# Patient Record
Sex: MALE | Race: WHITE | NOT HISPANIC OR LATINO | Employment: OTHER | URBAN - METROPOLITAN AREA
[De-identification: names, ages, dates, MRNs, and addresses within clinical notes are randomized per-mention and may not be internally consistent; named-entity substitution may affect disease eponyms.]

---

## 2018-09-13 ENCOUNTER — APPOINTMENT (OUTPATIENT)
Dept: RADIOLOGY | Age: 83
End: 2018-09-13
Payer: MEDICARE

## 2018-09-13 ENCOUNTER — HOSPITAL ENCOUNTER (OUTPATIENT)
Dept: RADIOLOGY | Facility: HOSPITAL | Age: 83
Discharge: HOME | End: 2018-09-13
Payer: MEDICARE

## 2018-09-13 ENCOUNTER — OFFICE VISIT (OUTPATIENT)
Dept: URGENT CARE | Age: 83
End: 2018-09-13
Payer: MEDICARE

## 2018-09-13 VITALS
WEIGHT: 138 LBS | SYSTOLIC BLOOD PRESSURE: 200 MMHG | OXYGEN SATURATION: 96 % | TEMPERATURE: 97.3 F | HEIGHT: 69 IN | HEART RATE: 70 BPM | DIASTOLIC BLOOD PRESSURE: 76 MMHG | BODY MASS INDEX: 20.44 KG/M2
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
## 2018-09-13 DIAGNOSIS — S40.811A ABRASION OF RIGHT UPPER EXTREMITY, INITIAL ENCOUNTER: Primary | ICD-10-CM

## 2018-09-13 DIAGNOSIS — S80.811A ABRASION OF RIGHT LOWER EXTREMITY, INITIAL ENCOUNTER: ICD-10-CM

## 2018-09-13 PROCEDURE — 73090 X-RAY EXAM OF FOREARM: CPT

## 2018-09-13 PROCEDURE — 73060 X-RAY EXAM OF HUMERUS: CPT

## 2018-09-13 PROCEDURE — 71100 X-RAY EXAM RIBS UNI 2 VIEWS: CPT

## 2018-09-13 PROCEDURE — G0463 HOSPITAL OUTPT CLINIC VISIT: HCPCS | Performed by: PHYSICIAN ASSISTANT

## 2018-09-13 RX ORDER — CEPHALEXIN 500 MG/1
500 CAPSULE ORAL EVERY 12 HOURS SCHEDULED
Qty: 14 CAPSULE | Refills: 0 | Status: SHIPPED | OUTPATIENT
Start: 2018-09-13 | End: 2018-09-20

## 2018-09-13 RX ORDER — ACETAMINOPHEN 325 MG/1
650 TABLET ORAL ONCE
Status: COMPLETED | OUTPATIENT
Start: 2018-09-13 | End: 2018-09-13

## 2018-09-13 RX ORDER — LISINOPRIL AND HYDROCHLOROTHIAZIDE 12.5; 1 MG/1; MG/1
1 TABLET ORAL DAILY
COMMUNITY

## 2018-09-13 RX ADMIN — ACETAMINOPHEN 650 MG: 325 TABLET ORAL at 16:04

## 2018-09-13 NOTE — PROGRESS NOTES
[facility]        NAME: Barby Arora is a 80 y o  male  : 1925    MRN: [id number]  DATE: 2018  TIME: 4:50 PM    Assessment and Plan   Abrasion of right upper extremity, initial encounter [S40 813D]  1  Abrasion of right upper extremity, initial encounter  cephalexin (KEFLEX) 500 mg capsule    Ambulatory referral to Wound Care   2  Abrasion of right lower extremity, initial encounter  cephalexin (KEFLEX) 500 mg capsule    Ambulatory referral to Wound Care   3  Fall, initial encounter  XR ribs 2 vw right    XR humerus right    XR forearm 2 vw right    acetaminophen (TYLENOL) tablet 650 mg    CANCELED: XR hand 2 vw right     XR ribs, humerus, forearm and hand without fracture or dislocation  Patient was given number for wound care specialist      Tylenol administered in office  Patient Instructions     Take Keflex as prescribed  Keep wound covered for 24 hours then change bandage 2 times per day  Keep clean, dry and apply topical antibiotic ointment  Tylenol or Ibuprofen for pain as needed  Have wound checked in 1-2 days  Watch for signs of infection  Follow up with PCP in 3-5 days  Go to ED if symptoms worsen      Chief Complaint     Chief Complaint   Patient presents with    Fall     patient fell while walking into his daughters house while carrying his cat carrier, he put down his cat carrier, lost balance and fell onto his right side causing large skin tear on right arm, pain on right back pain  History of Present Illness       Patient presents following a fall after he was placing down his cat when he lost balance and fell onto his R side in the backyard  States his R arm pressed into the R side of his back  States he hit the R aspect of his cheek bone  Admits to abrasions throughout his R upper arm, forearm, hand, and R leg  Last tetanus was within the past year  Denies blood thinners  Fall   The accident occurred less than 1 hour ago   He landed on concrete  The volume of blood lost was minimal  Point of impact: R humerusl R leg  Pain location: R aspect of back and R upper and lower arm  The pain is moderate  Exacerbated by: movement  Pertinent negatives include no abdominal pain, bowel incontinence, fever, headaches, hearing loss, loss of consciousness, nausea, numbness, tingling, visual change or vomiting  Treatments tried: Washed with soap and water  Review of Systems   Review of Systems   Constitutional: Negative for fever  Respiratory: Negative for cough and shortness of breath  Cardiovascular: Negative for chest pain and palpitations  Gastrointestinal: Negative for abdominal pain, bowel incontinence, nausea and vomiting  Musculoskeletal: Positive for arthralgias  Skin: Positive for color change and wound  Neurological: Negative for tingling, loss of consciousness, facial asymmetry, weakness, light-headedness, numbness and headaches  Current Medications       Current Outpatient Prescriptions:     lisinopril-hydrochlorothiazide (PRINZIDE,ZESTORETIC) 10-12 5 MG per tablet, Take 1 tablet by mouth daily, Disp: , Rfl:     cephalexin (KEFLEX) 500 mg capsule, Take 1 capsule (500 mg total) by mouth every 12 (twelve) hours for 7 days, Disp: 14 capsule, Rfl: 0  No current facility-administered medications for this visit  Current Allergies     Allergies as of 09/13/2018    (No Known Allergies)            The following portions of the patient's history were reviewed and updated as appropriate: allergies, current medications, past family history, past medical history, past social history, past surgical history and problem list      Past Medical History:   Diagnosis Date    Hypertension        Past Surgical History:   Procedure Laterality Date    CATARACT EXTRACTION      HERNIA REPAIR         No family history on file  Medications have been verified          Objective   BP (!) 200/76   Pulse 70   Temp (!) 97 3 °F (36 3 °C)  5' 9" (1 753 m)   Wt 62 6 kg (138 lb)   SpO2 96%   BMI 20 38 kg/m²        Physical Exam     Physical Exam   Constitutional: He is oriented to person, place, and time  He appears well-developed and well-nourished  No distress  HENT:   Head: Normocephalic and atraumatic  Right Ear: External ear normal    Left Ear: External ear normal    Mouth/Throat: Oropharynx is clear and moist    R zygomatic bone non-tender to palpation  Eyes: EOM are normal  Pupils are equal, round, and reactive to light  Neck: Normal range of motion  Cardiovascular: Normal rate, regular rhythm, normal heart sounds and intact distal pulses  Exam reveals no gallop and no friction rub  No murmur heard  Pulmonary/Chest: Effort normal and breath sounds normal  No respiratory distress  He has no wheezes  He has no rales  He exhibits no tenderness  Abdominal: Soft  There is no tenderness  Tenderness over R inferior ribs (posterior tenderness)  Musculoskeletal: He exhibits tenderness (R humerus and forearm)  He exhibits no edema or deformity  ROM and MS test unable to perform due to wound wrapping with gauze roll  Hips, bilateral legs, L arm, bilateral shoulders non-tender to palpation  C-L Spinous processes non-tender to palpation  Neurological: He is alert and oriented to person, place, and time  No cranial nerve deficit  UE/LE light touch sensation intact bilaterally  Skin: He is not diaphoretic  There is erythema  See photos below     Psychiatric: He has a normal mood and affect  His behavior is normal  Judgment and thought content normal    Vitals reviewed  After patient consent was obtained, areas were cleaned with betadine/saline solution  23 steri-strips were applied total  19 over R arm, 1 over R hand and 3 over R leg with benzoin  Area was covered with topical abx ointment, non-adherent pad and gauze roll

## 2018-09-13 NOTE — PATIENT INSTRUCTIONS
Take Keflex as prescribed  Keep wound covered for 24 hours then change bandage 2 times per day  Keep clean, dry and apply topical antibiotic ointment  Tylenol or Ibuprofen for pain as needed  Have wound checked in 1-2 days  Watch for signs of infection  Follow up with PCP in 3-5 days  Go to ED if symptoms worsen    Abrasion   WHAT YOU NEED TO KNOW:   An abrasion is a scrape on your skin  It happens when your skin rubs against a rough surface  Some examples of an abrasion include rug burn, a skinned elbow, or road rash  Abrasions can be many shapes and sizes  The wound may hurt, bleed, bruise, or swell  DISCHARGE INSTRUCTIONS:   Return to the emergency department if:   · The bleeding does not stop after 10 minutes of firm pressure  · You cannot rinse one or more foreign objects out of your wound  · You have red streaks on your skin coming from your wound  Contact your healthcare provider if:   · You have a fever or chills  · Your abrasion is red, warm, swollen, or draining pus  · You have questions or concerns about your condition or care  Care for your abrasion:   · Wash your hands and dry them with a clean towel  · Press a clean cloth against your wound to stop any bleeding  · Rinse your wound with a lot of clean water  Do not use harsh soap, alcohol, or iodine solutions  · Use a clean, wet cloth to remove any objects, such as small pieces of rocks or dirt  · Rub antibiotic ointment on your wound  This may help prevent infection and help your wound heal     · Cover the wound with a non-stick bandage  Change the bandage daily, and if gets wet or dirty  Follow up with your healthcare provider as directed:  Write down your questions so you remember to ask them during your visits  © 2017 [employer] Information is for End User's use only and may not be sold, redistributed or otherwise used for commercial purposes   All illustrations and images included in CareNotes® are the copyrighted property of [employer]  or [employer]  The above information is an  only  It is not intended as medical advice for individual conditions or treatments  Talk to your doctor, nurse or pharmacist before following any medical regimen to see if it is safe and effective for you

## 2018-09-14 ENCOUNTER — OFFICE VISIT (OUTPATIENT)
Dept: URGENT CARE | Age: 83
End: 2018-09-14
Payer: MEDICARE

## 2018-09-14 VITALS
OXYGEN SATURATION: 98 % | BODY MASS INDEX: 19.05 KG/M2 | WEIGHT: 129 LBS | RESPIRATION RATE: 20 BRPM | DIASTOLIC BLOOD PRESSURE: 84 MMHG | HEART RATE: 77 BPM | SYSTOLIC BLOOD PRESSURE: 176 MMHG | TEMPERATURE: 96.3 F

## 2018-09-14 DIAGNOSIS — Z51.89 ENCOUNTER FOR POST-TRAUMATIC WOUND CHECK: Primary | ICD-10-CM

## 2018-09-14 DIAGNOSIS — S22.31XD CLOSED FRACTURE OF ONE RIB OF RIGHT SIDE WITH ROUTINE HEALING, SUBSEQUENT ENCOUNTER: ICD-10-CM

## 2018-09-14 PROCEDURE — 99212 OFFICE O/P EST SF 10 MIN: CPT | Performed by: NURSE PRACTITIONER

## 2018-09-14 PROCEDURE — G0463 HOSPITAL OUTPT CLINIC VISIT: HCPCS | Performed by: NURSE PRACTITIONER

## 2018-09-14 NOTE — PATIENT INSTRUCTIONS
Keep areas clean and dry  No soaking in water  Continue medication as prescribed  Continue to monitor  Acute Wound Care   AMBULATORY CARE:   An acute wound  is an injury that causes a break in the skin  An acute wound can happen suddenly, last a short time, and may heal on its own  Common signs and symptoms of an acute wound:   · A cut, tear, or gash in your skin    · Bleeding, swelling, pain, or trouble moving the affected area    · Dirt or foreign objects inside the wound     · Milky, yellow, green, or brown pus in the wound     · Red, tender, or warm area around the pus    · Fever  Seek care immediately if:   · You have pus or a foul odor coming from the wound  · You have sudden trouble breathing or chest pain  · Blood soaks through your bandage  Contact your healthcare provider if:   · You have muscle, joint, or body aches, sweating, or a fever  · You have more swelling, redness, or bleeding in your wound  · Your skin is itchy, swollen, or you have a rash  · You have questions or concerns about your condition or care  Treatment for an acute wound  may include any of the following:  · Cleansing  is done with soap and water to wash away germs and decrease the risk of infection  Sterile water further cleans the wound  The cleaning is done under high pressure with a catheter tip and large syringe  A solution that kills germs may also be used  · Debridement  is done to clean and remove objects, dirt, or dead tissues from the open wound  Healthcare providers may also drain the wound to clean out pus  · Closure of the wound  is done with stitches, staples, skin adhesive, or other treatments  This may be done if the wound is wide or deep  Stitches may be needed if the wound is in an area that moves a lot, such as the hands, feet, and joints  Stitches may help to keep the wound from getting infected  They may also decrease the amount of scarring you have   Some wounds may heal better without stitches  Wound care:   · If your wound was closed with thin strips of medical tape, keep them clean and dry  The strips of medical tape will fall off on their own  Do not pull them off  · Keep the bandage clean and dry  Do not remove the bandage over your wound unless your healthcare provider says it is okay  · Wash your hands before and after you take care of your wound to prevent infection  · Clean the wound as directed  If you cannot reach the wound, have someone help you  · If you have packing, make sure all the gauze used to pack the wound is taken out and replaced as directed  Keep track of how many gauze dressings are placed inside the wound  Follow up with your healthcare provider as directed:  Write down your questions so you remember to ask them during your visits  © 2016 [employer] is for End User's use only and may not be sold, redistributed or otherwise used for commercial purposes  All illustrations and images included in CareNotes® are the copyrighted property of [employer]  or [employer]  The above information is an  only  It is not intended as medical advice for individual conditions or treatments  Talk to your doctor, nurse or pharmacist before following any medical regimen to see if it is safe and effective for you

## 2018-09-14 NOTE — PROGRESS NOTES
[facility]        NAME: Bart Cook is a 80 y o  male  : 1925    MRN: [id number]  DATE: 2018  TIME: 11:50 AM    Assessment and Plan   Encounter for post-traumatic wound check [Z51 89]  1  Encounter for post-traumatic wound check     2  Closed fracture of one rib of right side with routine healing, subsequent encounter           Patient Instructions     Keep areas clean and dry  No soaking in water  Continue medication as prescribed  Continue deep breathing exercises as discussed  Continue to monitor  Follow up with PCP in 3-5 days  Proceed to  ER if symptoms worsen  Chief Complaint     Chief Complaint   Patient presents with    Wound Check     skin tear check from fall yesterday  last dose of Tylenol was at 0100 which was effective for right rib pain  encouraged to deep breath  History of Present Illness       80year-old male presenting today for routine re-evaluation of multiple skin tears s/p fall yesterday  Taking abx as prescribed  He reports no pain from wounds  No f/c  No active drainage  He continues with right-sided rib pain, has been performing deep breathing exercises as directed  No SOB  No other complaints at this time  Wound Check         Review of Systems   Review of Systems   Constitutional: Negative  HENT: Negative  Respiratory: Negative  Cardiovascular: Positive for chest pain (along right rib cage)  Skin: Positive for wound  Current Medications       Current Outpatient Prescriptions:     cephalexin (KEFLEX) 500 mg capsule, Take 1 capsule (500 mg total) by mouth every 12 (twelve) hours for 7 days, Disp: 14 capsule, Rfl: 0    lisinopril-hydrochlorothiazide (PRINZIDE,ZESTORETIC) 10-12 5 MG per tablet, Take 1 tablet by mouth daily, Disp: , Rfl:   No current facility-administered medications for this visit       Current Allergies     Allergies as of 2018    (No Known Allergies)            The following portions of the patient's history were reviewed and updated as appropriate: allergies, current medications, past family history, past medical history, past social history, past surgical history and problem list      Past Medical History:   Diagnosis Date    Hypertension        Past Surgical History:   Procedure Laterality Date    CATARACT EXTRACTION      HERNIA REPAIR         No family history on file  Medications have been verified  Objective   BP (!) 176/84   Pulse 77   Temp (!) 96 3 °F (35 7 °C)   Resp 20   Wt 58 5 kg (129 lb)   SpO2 98%   BMI 19 05 kg/m²        Physical Exam     Physical Exam   Constitutional: He is oriented to person, place, and time  He appears well-developed and well-nourished  Cardiovascular: Normal rate, regular rhythm and normal heart sounds  Pulmonary/Chest: Effort normal and breath sounds normal  No respiratory distress  He has no wheezes  He has no rales  He exhibits tenderness (right rib cage)  Neurological: He is alert and oriented to person, place, and time  Skin: Skin is warm and dry  Skin tears healing without s/s infection  Steristrips intact  No active drainage  DNVI  Psychiatric: He has a normal mood and affect  His behavior is normal  Judgment and thought content normal    Nursing note and vitals reviewed